# Patient Record
Sex: FEMALE | Race: WHITE | NOT HISPANIC OR LATINO | Employment: OTHER | URBAN - METROPOLITAN AREA
[De-identification: names, ages, dates, MRNs, and addresses within clinical notes are randomized per-mention and may not be internally consistent; named-entity substitution may affect disease eponyms.]

---

## 2017-12-08 ENCOUNTER — APPOINTMENT (OUTPATIENT)
Dept: WOMENS IMAGING | Facility: HOSPITAL | Age: 57
End: 2017-12-08

## 2017-12-08 PROCEDURE — G0202 SCR MAMMO BI INCL CAD: HCPCS | Performed by: RADIOLOGY

## 2017-12-08 PROCEDURE — 77067 SCR MAMMO BI INCL CAD: CPT | Performed by: RADIOLOGY

## 2018-12-10 ENCOUNTER — APPOINTMENT (OUTPATIENT)
Dept: WOMENS IMAGING | Facility: HOSPITAL | Age: 58
End: 2018-12-10

## 2018-12-10 PROCEDURE — 77067 SCR MAMMO BI INCL CAD: CPT | Performed by: RADIOLOGY

## 2018-12-10 PROCEDURE — 77063 BREAST TOMOSYNTHESIS BI: CPT | Performed by: RADIOLOGY

## 2019-12-19 ENCOUNTER — APPOINTMENT (OUTPATIENT)
Dept: WOMENS IMAGING | Facility: HOSPITAL | Age: 59
End: 2019-12-19

## 2019-12-19 PROCEDURE — 77067 SCR MAMMO BI INCL CAD: CPT | Performed by: RADIOLOGY

## 2019-12-19 PROCEDURE — 77063 BREAST TOMOSYNTHESIS BI: CPT | Performed by: RADIOLOGY

## 2020-12-22 ENCOUNTER — APPOINTMENT (OUTPATIENT)
Dept: WOMENS IMAGING | Facility: HOSPITAL | Age: 60
End: 2020-12-22

## 2020-12-22 PROCEDURE — 77063 BREAST TOMOSYNTHESIS BI: CPT | Performed by: RADIOLOGY

## 2020-12-22 PROCEDURE — 77067 SCR MAMMO BI INCL CAD: CPT | Performed by: RADIOLOGY

## 2021-07-22 ENCOUNTER — OFFICE VISIT (OUTPATIENT)
Dept: ENDOCRINOLOGY | Age: 61
End: 2021-07-22

## 2021-07-22 VITALS
WEIGHT: 145.8 LBS | HEART RATE: 77 BPM | OXYGEN SATURATION: 98 % | SYSTOLIC BLOOD PRESSURE: 126 MMHG | HEIGHT: 63 IN | DIASTOLIC BLOOD PRESSURE: 70 MMHG | BODY MASS INDEX: 25.83 KG/M2

## 2021-07-22 DIAGNOSIS — Z78.0 POST-MENOPAUSAL: ICD-10-CM

## 2021-07-22 DIAGNOSIS — E83.52 HYPERCALCEMIA: Primary | ICD-10-CM

## 2021-07-22 PROCEDURE — 99204 OFFICE O/P NEW MOD 45 MIN: CPT | Performed by: INTERNAL MEDICINE

## 2021-07-22 RX ORDER — LOSARTAN POTASSIUM AND HYDROCHLOROTHIAZIDE 25; 100 MG/1; MG/1
TABLET ORAL
COMMUNITY
Start: 2021-07-07 | End: 2022-01-24 | Stop reason: CLARIF

## 2021-07-22 RX ORDER — TRAZODONE HYDROCHLORIDE 150 MG/1
TABLET ORAL
COMMUNITY
Start: 2021-07-07

## 2021-07-22 RX ORDER — ESCITALOPRAM OXALATE 20 MG/1
TABLET ORAL
COMMUNITY
Start: 2021-07-07

## 2021-07-22 NOTE — PROGRESS NOTES
"Chief Complaint   Patient presents with   • Abnormal Calcium   NEW PATIENT/ ABNORMAL CALCIUM       HPI  Mary Rausch,61 y.o. is here as a new pt for the evaluation of elevated calcium levels. Consulted by .    Pt ca was noted to be elevated on routine blood work up with low - 18 pg/ml; PTH levels.   No hx of kidney stones, no hx of fragility fractures, no hx of osteopenia. No family hx of kidney stones and no family hx of osteoporosis.   Not on Ca and hasn't been on Vitamin D supplementation recently.  No c/o occasional abdominal pain, c/o bone pains and joint pain, no increased urination and no increased thirst.   Pt is on HCTZ - bp medication. Currently on no steroids. Drinks milk daily, eats cheese daily and yogurt.   Tums -  week.     No DEXA scan in the system and no 24 hr urine calcium levels.       Reviewed primary care physician's/consulting physician documentation and lab results       I have reviewed the patient's allergies, medicines, past medical hx, family hx and social hx in detail.    PMHX -   HTN   Stage 4 - breast and ovarian cancer    Family hx -   Parathyroid disease      Social History     Socioeconomic History   • Marital status: Single     Spouse name: Not on file   • Number of children: Not on file   • Years of education: Not on file   • Highest education level: Not on file   Tobacco Use   • Smoking status: Never Smoker   • Smokeless tobacco: Never Used       No Known Allergies      Current Outpatient Medications:   •  escitalopram (LEXAPRO) 20 MG tablet, , Disp: , Rfl:   •  losartan-hydrochlorothiazide (HYZAAR) 100-25 MG per tablet, , Disp: , Rfl:   •  traZODone (DESYREL) 150 MG tablet, , Disp: , Rfl:          Objective:    /70   Pulse 77   Ht 160 cm (63\")   Wt 66.1 kg (145 lb 12.8 oz)   SpO2 98%   BMI 25.83 kg/m²     Physical Exam  General appearance - no distress  Eyes- anicteric sclera  Ear nose and throat-external ears and nose normal.    Respiratory-normal chest " "on inspection.  No respiratory distress noted.  Skin-no rashes.  Neuro-alert and oriented x3            Results Review:    I reviewed the patient's new clinical results.    No results found for any previous visit.       Diagnoses and all orders for this visit:    1. Hypercalcemia (Primary)  -     TSH  -     T4, Free  -     T3, Free  -     Comprehensive Metabolic Panel  -     Protein Elec + Interp, Serum  -     Protein Electrophoresis, 24 Hr Urine - Urine, Clean Catch  -     PTH, Intact & Calcium  -     Vitamin D 25 Hydroxy  -     Phosphorus  -     Calcium, Ionized  -     Magnesium  -     Angiotensin Converting Enzyme  -     Vitamin D 1,25 Dihydroxy  -     DEXA Bone Density Axial; Future    2. Post-menopausal  -     TSH  -     T4, Free  -     T3, Free  -     Comprehensive Metabolic Panel  -     Protein Elec + Interp, Serum  -     Protein Electrophoresis, 24 Hr Urine - Urine, Clean Catch  -     PTH, Intact & Calcium  -     Vitamin D 25 Hydroxy  -     Phosphorus  -     Calcium, Ionized  -     Magnesium  -     Angiotensin Converting Enzyme  -     Vitamin D 1,25 Dihydroxy  -     DEXA Bone Density Axial; Future      Postmenopausal  Proceed with a bone density scan    Hypercalcemia  Noted that parathyroid levels are appropriately suppressed  Check PTH RP  Check serum and urine protein electrophoresis to rule out multiple myeloma  Repeat parathyroid calcium, vitamin D, phosphorus and magnesium levels.    Check ACE and vitamin 125 levels to rule out sarcoidosis.    Patient will need dental clearance prior to proceeding with a bone medication.        Thank you for asking me to see your patient Mary Rausch in consultation.      Refills/medications sent to pharmacy    Freddy Cobb MD  07/22/21    EMR Dragon / transcription disclaimer:     \"Dictated utilizing Dragon dictation\".         "

## 2021-07-23 LAB
1,25(OH)2D SERPL-MCNC: 57.3 PG/ML (ref 19.9–79.3)
25(OH)D3+25(OH)D2 SERPL-MCNC: 49.7 NG/ML (ref 30–100)
ACE SERPL-CCNC: 31 U/L (ref 14–82)
ALBUMIN SERPL ELPH-MCNC: 4.3 G/DL (ref 2.9–4.4)
ALBUMIN SERPL-MCNC: 4.7 G/DL (ref 3.5–5.2)
ALBUMIN/GLOB SERPL: 1.3 {RATIO} (ref 0.7–1.7)
ALBUMIN/GLOB SERPL: 1.6 G/DL
ALP SERPL-CCNC: 69 U/L (ref 39–117)
ALPHA1 GLOB SERPL ELPH-MCNC: 0.3 G/DL (ref 0–0.4)
ALPHA2 GLOB SERPL ELPH-MCNC: 0.7 G/DL (ref 0.4–1)
ALT SERPL-CCNC: 7 U/L (ref 1–33)
AST SERPL-CCNC: 21 U/L (ref 1–32)
B-GLOBULIN SERPL ELPH-MCNC: 1.2 G/DL (ref 0.7–1.3)
BILIRUB SERPL-MCNC: 0.4 MG/DL (ref 0–1.2)
BUN SERPL-MCNC: 10 MG/DL (ref 8–23)
BUN/CREAT SERPL: 13.2 (ref 7–25)
CA-I SERPL ISE-MCNC: 5.9 MG/DL (ref 4.5–5.6)
CALCIUM SERPL-MCNC: 11 MG/DL (ref 8.6–10.5)
CHLORIDE SERPL-SCNC: 101 MMOL/L (ref 98–107)
CO2 SERPL-SCNC: 24.7 MMOL/L (ref 22–29)
CREAT SERPL-MCNC: 0.76 MG/DL (ref 0.57–1)
GAMMA GLOB SERPL ELPH-MCNC: 1.3 G/DL (ref 0.4–1.8)
GLOBULIN SER CALC-MCNC: 3 GM/DL
GLOBULIN SER CALC-MCNC: 3.4 G/DL (ref 2.2–3.9)
GLUCOSE SERPL-MCNC: 90 MG/DL (ref 65–99)
INTACT PTH: NORMAL
LABORATORY COMMENT REPORT: NORMAL
M PROTEIN SERPL ELPH-MCNC: NORMAL G/DL
MAGNESIUM SERPL-MCNC: 1.6 MG/DL (ref 1.6–2.4)
PHOSPHATE SERPL-MCNC: 2.1 MG/DL (ref 2.5–4.5)
POTASSIUM SERPL-SCNC: 3.4 MMOL/L (ref 3.5–5.2)
PROT PATTERN SERPL ELPH-IMP: NORMAL
PROT SERPL-MCNC: 7.7 G/DL (ref 6–8.5)
PTH-INTACT SERPL-MCNC: 24 PG/ML (ref 15–65)
SODIUM SERPL-SCNC: 137 MMOL/L (ref 136–145)
T3FREE SERPL-MCNC: 3 PG/ML (ref 2–4.4)
T4 FREE SERPL-MCNC: 1.14 NG/DL (ref 0.93–1.7)
TSH SERPL DL<=0.005 MIU/L-ACNC: 1.27 UIU/ML (ref 0.27–4.2)

## 2021-07-26 DIAGNOSIS — E83.52 HYPERCALCEMIA: Primary | ICD-10-CM

## 2021-07-31 LAB
CALCIUM 24H UR-MCNC: 3.1 MG/DL
CALCIUM 24H UR-MRATE: 85 MG/24 HR (ref 47–462)
CREAT 24H UR-MRATE: 0.74 G/24 HR (ref 0.7–1.6)
CREAT UR-MCNC: 26.8 MG/DL

## 2021-10-28 ENCOUNTER — HOSPITAL ENCOUNTER (OUTPATIENT)
Dept: BONE DENSITY | Facility: HOSPITAL | Age: 61
Discharge: HOME | End: 2021-10-28
Admitting: INTERNAL MEDICINE

## 2021-10-28 DIAGNOSIS — E83.52 HYPERCALCEMIA: ICD-10-CM

## 2021-10-28 DIAGNOSIS — Z78.0 POST-MENOPAUSAL: ICD-10-CM

## 2021-10-28 PROCEDURE — 77080 DXA BONE DENSITY AXIAL: CPT

## 2022-01-20 ENCOUNTER — APPOINTMENT (OUTPATIENT)
Dept: WOMENS IMAGING | Facility: HOSPITAL | Age: 62
End: 2022-01-20

## 2022-01-20 PROCEDURE — 77067 SCR MAMMO BI INCL CAD: CPT | Performed by: RADIOLOGY

## 2022-01-20 PROCEDURE — 77063 BREAST TOMOSYNTHESIS BI: CPT | Performed by: RADIOLOGY

## 2022-01-24 ENCOUNTER — OFFICE VISIT (OUTPATIENT)
Dept: ENDOCRINOLOGY | Age: 62
End: 2022-01-24

## 2022-01-24 VITALS
HEIGHT: 63 IN | SYSTOLIC BLOOD PRESSURE: 122 MMHG | DIASTOLIC BLOOD PRESSURE: 72 MMHG | HEART RATE: 70 BPM | WEIGHT: 144.8 LBS | OXYGEN SATURATION: 98 % | BODY MASS INDEX: 25.66 KG/M2

## 2022-01-24 DIAGNOSIS — E83.52 HYPERCALCEMIA: ICD-10-CM

## 2022-01-24 DIAGNOSIS — Z78.0 POST-MENOPAUSAL: Primary | ICD-10-CM

## 2022-01-24 PROCEDURE — 99214 OFFICE O/P EST MOD 30 MIN: CPT | Performed by: INTERNAL MEDICINE

## 2022-01-24 RX ORDER — LOSARTAN POTASSIUM 100 MG/1
TABLET ORAL
COMMUNITY
Start: 2021-12-16

## 2022-01-24 NOTE — PROGRESS NOTES
"Chief Complaint  Chief Complaint   Patient presents with   • Abnormal Calcium   FOLLOW UP/ ABNORMAL CALCIUM     Subjective          History of Present Illness    Mary Rausch,62 y.o. is here as a follow-up for the evaluation of hypercalcemia.    Patient's calcium was noted to be elevated on routine blood work-up, but with lower end of normal parathyroid levels.  No history of kidney stones, fragility fractures.  Bone density scan from October 2021 showed osteopenia in her right hip.  Has been on calcium supplementation approximately 500 mg once daily.  Has not been on any vitamin D replacement recently.    She was on hydrochlorothiazide as her blood pressure medication but that was discontinued during her visit with us in July 2021.    Bone density scan from October 2021 showed osteopenia in her hips.  She has an upcoming appointment with the dentist on Wednesday.    Urine calcium and creatinine ratio-0.007    Reviewed primary care physician's/consulting physician documentation and lab results         I have reviewed the patient's allergies, medicines, past medical hx, family hx and social hx in detail.    Objective   Vital Signs:   /72   Pulse 70   Ht 160 cm (63\")   Wt 65.7 kg (144 lb 12.8 oz)   SpO2 98%   BMI 25.65 kg/m²   Physical Exam   General appearance - no distress  Eyes- anicteric sclera  Ear nose and throat-external ears and nose normal.    Respiratory-normal chest on inspection.  No respiratory distress noted.  Skin-no rashes.  Neuro-alert and oriented x3          Result Review :   The following data was reviewed by: Freddy Cobb MD on 01/24/2022:  Orders Only on 07/30/2021   Component Date Value Ref Range Status   • Creatinine, Urine 07/30/2021 26.8  mg/dL Final   • Creatinine, 24H  07/30/2021 0.74  0.70 - 1.60 g/24 hr Final   • Calcium, Urine 07/30/2021 3.1  Not Estab. mg/dL Final   • Calcium, Ur, Timed 07/30/2021 85  47 - 462 mg/24 hr Final     Data reviewed: PCP notes       Results " "Review:    I reviewed the patient's new clinical results.     Assessment and Plan    Problem List Items Addressed This Visit     None      Visit Diagnoses     Post-menopausal    -  Primary    Relevant Orders    Vitamin D 25 Hydroxy    Hemoglobin A1c    Lipid Panel    Calcium, Ionized    Phosphorus    Magnesium    PTH, Intact & Calcium    Comprehensive Metabolic Panel    Hypercalcemia        Relevant Orders    Vitamin D 25 Hydroxy    Hemoglobin A1c    Lipid Panel    Calcium, Ionized    Phosphorus    Magnesium    PTH, Intact & Calcium    Comprehensive Metabolic Panel        Postmenopausal-osteopenia  Discussed with the patient benefits of alendronate  Continue calcium and vitamin D.    Recommended the patient also get the clearance from the dentist before she proceeds with the medication.    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 Please see specific information pulled into the AVS if appropriate.       Thank you for asking me to see your patient, Mary Rausch in consultation.         Freddy Cobb MD  01/24/22      EMR Dragon / transcription disclaimer:     \"Dictated utilizing Dragon dictation\".                     "

## 2022-01-25 LAB
25(OH)D3+25(OH)D2 SERPL-MCNC: 33.2 NG/ML (ref 30–100)
ALBUMIN SERPL-MCNC: 4.5 G/DL (ref 3.8–4.8)
ALBUMIN/GLOB SERPL: 1.4 {RATIO} (ref 1.2–2.2)
ALP SERPL-CCNC: 75 IU/L (ref 44–121)
ALT SERPL-CCNC: 12 IU/L (ref 0–32)
AST SERPL-CCNC: 19 IU/L (ref 0–40)
BILIRUB SERPL-MCNC: 0.4 MG/DL (ref 0–1.2)
BUN SERPL-MCNC: 15 MG/DL (ref 8–27)
BUN/CREAT SERPL: 16 (ref 12–28)
CA-I SERPL ISE-MCNC: 5.9 MG/DL (ref 4.5–5.6)
CALCIUM SERPL-MCNC: 10.6 MG/DL (ref 8.7–10.3)
CHLORIDE SERPL-SCNC: 102 MMOL/L (ref 96–106)
CHOLEST SERPL-MCNC: 232 MG/DL (ref 100–199)
CO2 SERPL-SCNC: 24 MMOL/L (ref 20–29)
CREAT SERPL-MCNC: 0.93 MG/DL (ref 0.57–1)
GLOBULIN SER CALC-MCNC: 3.2 G/DL (ref 1.5–4.5)
GLUCOSE SERPL-MCNC: 91 MG/DL (ref 65–99)
HBA1C MFR BLD: 5.2 % (ref 4.8–5.6)
HDLC SERPL-MCNC: 89 MG/DL
IMP & REVIEW OF LAB RESULTS: NORMAL
INTACT PTH: NORMAL
LDLC SERPL CALC-MCNC: 130 MG/DL (ref 0–99)
MAGNESIUM SERPL-MCNC: 1.7 MG/DL (ref 1.6–2.3)
PHOSPHATE SERPL-MCNC: 2.7 MG/DL (ref 3–4.3)
POTASSIUM SERPL-SCNC: 4.4 MMOL/L (ref 3.5–5.2)
PROT SERPL-MCNC: 7.7 G/DL (ref 6–8.5)
PTH-INTACT SERPL-MCNC: 27 PG/ML (ref 15–65)
SODIUM SERPL-SCNC: 139 MMOL/L (ref 134–144)
TRIGL SERPL-MCNC: 74 MG/DL (ref 0–149)
VLDLC SERPL CALC-MCNC: 13 MG/DL (ref 5–40)

## 2022-01-25 NOTE — PROGRESS NOTES
Your calcium level has significantly improved from 11 to 10.6.  At this point we will continue to monitor.Let us know if you have made up your mind regarding the bone medication-alendronate/Fosamax.Most probably the type of problem-hypercalcemia could be genetic related.

## 2022-02-24 ENCOUNTER — TELEPHONE (OUTPATIENT)
Dept: ENDOCRINOLOGY | Age: 62
End: 2022-02-24

## 2022-03-18 DIAGNOSIS — E83.52 HYPERCALCEMIA: ICD-10-CM

## 2022-03-18 DIAGNOSIS — Z78.0 POST-MENOPAUSAL: Primary | ICD-10-CM

## 2022-05-20 ENCOUNTER — OFFICE VISIT (OUTPATIENT)
Dept: OBSTETRICS AND GYNECOLOGY | Age: 62
End: 2022-05-20

## 2022-05-20 VITALS
BODY MASS INDEX: 25.69 KG/M2 | DIASTOLIC BLOOD PRESSURE: 72 MMHG | SYSTOLIC BLOOD PRESSURE: 122 MMHG | HEIGHT: 63 IN | WEIGHT: 145 LBS

## 2022-05-20 DIAGNOSIS — Z85.43 H/O OVARIAN CANCER: ICD-10-CM

## 2022-05-20 DIAGNOSIS — Z01.419 WELL FEMALE EXAM WITH ROUTINE GYNECOLOGICAL EXAM: Primary | ICD-10-CM

## 2022-05-20 DIAGNOSIS — Z13.89 SCREENING FOR BLOOD OR PROTEIN IN URINE: ICD-10-CM

## 2022-05-20 LAB
BILIRUB BLD-MCNC: NEGATIVE MG/DL
CLARITY, POC: CLEAR
COLOR UR: YELLOW
GLUCOSE UR STRIP-MCNC: NEGATIVE MG/DL
KETONES UR QL: NEGATIVE
LEUKOCYTE EST, POC: NEGATIVE
NITRITE UR-MCNC: NEGATIVE MG/ML
PH UR: 5.5 [PH] (ref 5–8)
PROT UR STRIP-MCNC: NEGATIVE MG/DL
RBC # UR STRIP: NEGATIVE /UL
SP GR UR: 1.02 (ref 1–1.03)
UROBILINOGEN UR QL: NORMAL

## 2022-05-20 PROCEDURE — 99386 PREV VISIT NEW AGE 40-64: CPT | Performed by: OBSTETRICS & GYNECOLOGY

## 2022-05-20 PROCEDURE — 81003 URINALYSIS AUTO W/O SCOPE: CPT | Performed by: OBSTETRICS & GYNECOLOGY

## 2022-05-20 RX ORDER — ESTRADIOL 10 UG/1
1 INSERT VAGINAL 2 TIMES WEEKLY
COMMUNITY
End: 2022-10-18 | Stop reason: SDUPTHER

## 2022-05-20 NOTE — PROGRESS NOTES
[facility]   Obstetrics and Gynecology       2022    Patient: Mary Rausch          MR#:[id number]    History of Present Illness    Chief Complaint   Patient presents with   • Gynecologic Exam     New pt annual exam , doesn't get paps any more Has a history of ovarian and breast cancer both in , last m/g 2022 neg , dexa 10/28/2021, c/scope due in        62 y.o. female  who presents for annual exam.    Patient presents feeling well without any complaints.  She has a remote history of nonhormonal receptor negative breast cancer in early stage endometrial cancer treated with surgery and chemo.  The patient's remained disease-free since her original surgery.    I discussed some concerns with the patient taking vaginal estrogen with history of breast cancer but she reports only using the Vagifem periodically for severe symptoms of atrophy and she is confident she was receptor negative        Studies reviewed:    No LMP recorded. Patient has had a hysterectomy.  Obstetric History:  OB History        2    Para   2    Term   2            AB        Living   2       SAB        IAB        Ectopic        Molar        Multiple        Live Births   2               Menstrual History:     No LMP recorded. Patient has had a hysterectomy.       Sexual History:       Social History     Substance and Sexual Activity   Sexual Activity Yes       ________________________________________  Patient Active Problem List   Diagnosis   • History of ovarian cancer   • Hx antineoplastic chemotherapy   • Hx of hysterectomy   • Hx of migraines   • Hypertension   • Personal history of breast cancer   • Vaginal atrophy     Past Medical History:   Diagnosis Date   • Breast cancer (HCC)    • Endometrial cancer (HCC)    • Hypertension      Past Surgical History:   Procedure Laterality Date   • APPENDECTOMY      age 13   • BILATERAL SALPINGO OOPHORECTOMY     • BREAST LUMPECTOMY     •  " SECTION     • HYSTERECTOMY         • VEIN SURGERY       Social History     Tobacco Use   Smoking Status Never Smoker   Smokeless Tobacco Never Used     Family History   Problem Relation Age of Onset   • Other Sister         fallopian tube.     Prior to Admission medications    Medication Sig Start Date End Date Taking? Authorizing Provider   escitalopram (LEXAPRO) 20 MG tablet  21  Yes Gera Samayoa MD   estradiol (VAGIFEM) 10 MCG tablet vaginal tablet Insert 1 tablet into the vagina 2 (Two) Times a Week.   Yes Gera Samayoa MD   losartan (COZAAR) 100 MG tablet  21  Yes Gera Samayoa MD   traZODone (DESYREL) 150 MG tablet  21  Yes ProviderGera MD     ________________________________________    Current contraception: status post hysterectomy  History of abnormal Pap smear: no  Family history of uterine or ovarian cancer: no  Family History of colon cancer/colon polyps: no  History of abnormal mammogram: no  History of abnormal lipids: no    The following portions of the patient's history were reviewed and updated as appropriate: allergies, current medications, past family history, past medical history, past social history, past surgical history and problem list.    Review of Systems    Pertinent items are noted in HPI.       Objective   Physical Exam    /72   Ht 160 cm (63\")   Wt 65.8 kg (145 lb)   Breastfeeding No   BMI 25.69 kg/m²    BP Readings from Last 3 Encounters:   22 122/72   22 122/72   21 126/70      Wt Readings from Last 3 Encounters:   22 65.8 kg (145 lb)   22 65.7 kg (144 lb 12.8 oz)   21 66.1 kg (145 lb 12.8 oz)        BMI: Estimated body mass index is 25.69 kg/m² as calculated from the following:    Height as of this encounter: 160 cm (63\").    Weight as of this encounter: 65.8 kg (145 lb).       General: alert, appears stated age and cooperative   Heart: regular rate and rhythm, S1, S2 normal, no " murmur, click, rub or gallop   Lungs: clear to auscultation bilaterally   Abdomen: soft, non-tender, without masses, no organomegaly   Breast: inspection negative, no nipple discharge or bleeding, no masses or nodularity palpable and prior lumpectomy in the right breast evident   External genitalia/Vulva: moderate atrophy, External genitalia including bartholin's glands, Urethra, [place]'s gland and urethra meatus are normal, Perineum, rectum and anus appear normal  and Bladder appears normal without significant prolapse    Vagina: normal mucosa, normal discharge   Cervix: absent   Uterus: absent    Adnexa: normal adnexa     As part of wellness and prevention, the following topics were discussed with the patient:  Encouraged self breast exam  Physical activity and regular exercised encouraged.     Assessment:  Diagnoses and all orders for this visit:    1. Well female exam with routine gynecological exam (Primary)    2. Screening for blood or protein in urine  -     POC Urinalysis Dipstick, Multipro    3. H/O ovarian cancer  -             Plan:  Return in about 2 years (around 5/20/2024) for Annual GYN exam.    Darian Mancera MD  5/20/2022 12:33 EDT

## 2022-05-28 LAB — CANCER AG125 SERPL-ACNC: 7.1 U/ML (ref 0–38.1)

## 2022-09-09 ENCOUNTER — OFFICE VISIT (OUTPATIENT)
Dept: ENDOCRINOLOGY | Age: 62
End: 2022-09-09

## 2022-09-09 VITALS
HEIGHT: 63 IN | BODY MASS INDEX: 25.98 KG/M2 | OXYGEN SATURATION: 99 % | HEART RATE: 67 BPM | DIASTOLIC BLOOD PRESSURE: 70 MMHG | TEMPERATURE: 96.6 F | SYSTOLIC BLOOD PRESSURE: 120 MMHG | WEIGHT: 146.6 LBS

## 2022-09-09 DIAGNOSIS — E83.52 HYPERCALCEMIA: ICD-10-CM

## 2022-09-09 DIAGNOSIS — Z78.0 POST-MENOPAUSAL: Primary | ICD-10-CM

## 2022-09-09 PROCEDURE — 99214 OFFICE O/P EST MOD 30 MIN: CPT | Performed by: INTERNAL MEDICINE

## 2022-09-09 RX ORDER — PHENOL 1.4 %
10 AEROSOL, SPRAY (ML) MUCOUS MEMBRANE
COMMUNITY

## 2022-09-09 RX ORDER — AMOXICILLIN AND CLAVULANATE POTASSIUM 875; 125 MG/1; MG/1
1 TABLET, FILM COATED ORAL 2 TIMES DAILY
COMMUNITY
Start: 2022-09-08 | End: 2022-09-18

## 2022-09-09 NOTE — PROGRESS NOTES
"Chief Complaint  Abnormal Calcium    Subjective            History of Present Illness  Mary Rausch,62 y.o. is here as a  follow-up for the evaluation of hypercalcemia.     Patient's calcium was noted to be elevated on routine blood work-up, but with lower end of normal parathyroid levels.  No history of kidney stones, fragility fractures.  Has been on vitamin D replacement but not on any calcium.     She was on hydrochlorothiazide as her blood pressure medication but that was discontinued during her visit with us in July 2021.     Bone density scan from October 2021 showed osteopenia in her hips. Urine calcium and creatinine ratio-0.007 -based on the work-up patient was thought to have family hypocalciuric hypercalcemia.    Osteopenia-patient got the clearance from the dentist to consider the Fosamax, however patient is still knows of to consider the medication and would want to follow behavioral changes.      Reviewed primary care physician's/consulting physician documentation and lab results     I have reviewed the patient's allergies, medicines, past medical hx, family hx and social hx in detail.    Objective   Vital Signs:   /70   Pulse 67   Temp 96.6 °F (35.9 °C)   Ht 160 cm (63\")   Wt 66.5 kg (146 lb 9.6 oz)   SpO2 99%   BMI 25.97 kg/m²     Physical Exam   General appearance - no distress  Eyes- anicteric sclera  Ear nose and throat-external ears and nose normal.    Respiratory-normal chest on inspection.  No respiratory distress noted.  Skin-no rashes.  Neuro-alert and oriented x3            Result Review :   The following data was reviewed by: Freddy Cobb MD on 09/09/2022:  Office Visit on 05/20/2022   Component Date Value Ref Range Status   • Color 05/20/2022 Yellow  Yellow, Straw, Dark Yellow, Saira Final   • Clarity, UA 05/20/2022 Clear  Clear Final   • Glucose, UA 05/20/2022 Negative  Negative, 1000 mg/dL (3+) mg/dL Final   • Bilirubin 05/20/2022 Negative  Negative Final   • Ketones, " UA 05/20/2022 Negative  Negative Final   • Specific Gravity  05/20/2022 1.025  1.005 - 1.030 Final   • Blood, UA 05/20/2022 Negative  Negative Final   • pH, Urine 05/20/2022 5.5  5.0 - 8.0 Final   • Protein, POC 05/20/2022 Negative  Negative mg/dL Final   • Urobilinogen, UA 05/20/2022 Normal  Normal Final   • Nitrite, UA 05/20/2022 Negative  Negative Final   • Leukocytes 05/20/2022 Negative  Negative Final   •  05/27/2022 7.1  0.0 - 38.1 U/mL Final    Comment: [employer] Electrochemiluminescence Immunoassay (ECLIA)  Values obtained with different assay methods or kits cannot be  used interchangeably.  Results cannot be interpreted as absolute  evidence of the presence or absence of malignant disease.       Data reviewed: PCP and endocrine notes        I reviewed the patient's new clinical results and mentioned them above in HPI and in plan as well.          Assessment and Plan    Problem List Items Addressed This Visit    None     Visit Diagnoses     Post-menopausal    -  Primary    Relevant Orders    TSH    T3, Free    T4, Free    Lipid Panel    Vitamin B12 & Folate    Vitamin D 25 Hydroxy    CBC Auto Differential    Comprehensive Metabolic Panel    Hypercalcemia        Relevant Orders    TSH    T3, Free    T4, Free    Lipid Panel    Vitamin B12 & Folate    Vitamin D 25 Hydroxy    CBC Auto Differential    Comprehensive Metabolic Panel        Hypercalcemia-etiology formerly [facility]  Continue to monitor the calcium level.    Postmenopausal-osteopenia  Patient is still apprehensive to proceed with this time patient will follow-up pending bone density scan in October 2023.    Follow Up   No follow-ups on file.    Refills/Meds sent to pharmacy    Interpreted the blood work-up/imaging results performed by the primary care/consulting physician -    Patient was given instructions and counseling regarding her condition or for health maintenance advice. Please see specific information pulled into the AVS if appropriate.

## 2022-09-10 LAB
25(OH)D3+25(OH)D2 SERPL-MCNC: 47.5 NG/ML (ref 30–100)
ALBUMIN SERPL-MCNC: 4.8 G/DL (ref 3.8–4.8)
ALBUMIN/GLOB SERPL: 1.7 {RATIO} (ref 1.2–2.2)
ALP SERPL-CCNC: 76 IU/L (ref 44–121)
ALT SERPL-CCNC: 16 IU/L (ref 0–32)
AST SERPL-CCNC: 22 IU/L (ref 0–40)
BASOPHILS # BLD AUTO: 0 X10E3/UL (ref 0–0.2)
BASOPHILS NFR BLD AUTO: 0 %
BILIRUB SERPL-MCNC: 0.4 MG/DL (ref 0–1.2)
BUN SERPL-MCNC: 20 MG/DL (ref 8–27)
BUN/CREAT SERPL: 22 (ref 12–28)
CALCIUM SERPL-MCNC: 10.9 MG/DL (ref 8.7–10.3)
CHLORIDE SERPL-SCNC: 97 MMOL/L (ref 96–106)
CHOLEST SERPL-MCNC: 230 MG/DL (ref 100–199)
CO2 SERPL-SCNC: 22 MMOL/L (ref 20–29)
CREAT SERPL-MCNC: 0.9 MG/DL (ref 0.57–1)
EGFRCR-CYS SERPLBLD CKD-EPI 2021: 71 ML/MIN/1.73
EOSINOPHIL # BLD AUTO: 0.1 X10E3/UL (ref 0–0.4)
EOSINOPHIL NFR BLD AUTO: 1 %
ERYTHROCYTE [DISTWIDTH] IN BLOOD BY AUTOMATED COUNT: 12.9 % (ref 11.7–15.4)
FOLATE SERPL-MCNC: 6.3 NG/ML
GLOBULIN SER CALC-MCNC: 2.9 G/DL (ref 1.5–4.5)
GLUCOSE SERPL-MCNC: 91 MG/DL (ref 65–99)
HCT VFR BLD AUTO: 41.2 % (ref 34–46.6)
HDLC SERPL-MCNC: 62 MG/DL
HGB BLD-MCNC: 13.7 G/DL (ref 11.1–15.9)
IMM GRANULOCYTES # BLD AUTO: 0 X10E3/UL (ref 0–0.1)
IMM GRANULOCYTES NFR BLD AUTO: 0 %
IMP & REVIEW OF LAB RESULTS: NORMAL
LDLC SERPL CALC-MCNC: 145 MG/DL (ref 0–99)
LYMPHOCYTES # BLD AUTO: 1.3 X10E3/UL (ref 0.7–3.1)
LYMPHOCYTES NFR BLD AUTO: 15 %
MCH RBC QN AUTO: 30.5 PG (ref 26.6–33)
MCHC RBC AUTO-ENTMCNC: 33.3 G/DL (ref 31.5–35.7)
MCV RBC AUTO: 92 FL (ref 79–97)
MONOCYTES # BLD AUTO: 0.4 X10E3/UL (ref 0.1–0.9)
MONOCYTES NFR BLD AUTO: 5 %
MORPHOLOGY BLD-IMP: NORMAL
NEUTROPHILS # BLD AUTO: 6.6 X10E3/UL (ref 1.4–7)
NEUTROPHILS NFR BLD AUTO: 79 %
PLATELET # BLD AUTO: NORMAL X10E3/UL
POTASSIUM SERPL-SCNC: 4.5 MMOL/L (ref 3.5–5.2)
PROT SERPL-MCNC: 7.7 G/DL (ref 6–8.5)
RBC # BLD AUTO: 4.49 X10E6/UL (ref 3.77–5.28)
SODIUM SERPL-SCNC: 136 MMOL/L (ref 134–144)
T3FREE SERPL-MCNC: 2.8 PG/ML (ref 2–4.4)
T4 FREE SERPL-MCNC: 1.14 NG/DL (ref 0.82–1.77)
TRIGL SERPL-MCNC: 128 MG/DL (ref 0–149)
TSH SERPL DL<=0.005 MIU/L-ACNC: 1.49 UIU/ML (ref 0.45–4.5)
VIT B12 SERPL-MCNC: 228 PG/ML (ref 232–1245)
VLDLC SERPL CALC-MCNC: 23 MG/DL (ref 5–40)
WBC # BLD AUTO: 8.4 X10E3/UL (ref 3.4–10.8)

## 2022-10-19 RX ORDER — ESTRADIOL 10 UG/1
1 INSERT VAGINAL 2 TIMES WEEKLY
Qty: 8 TABLET | Refills: 12 | Status: SHIPPED | OUTPATIENT
Start: 2022-10-20 | End: 2023-03-13 | Stop reason: SDUPTHER

## 2023-03-13 RX ORDER — ESTRADIOL 10 UG/1
1 INSERT VAGINAL 2 TIMES WEEKLY
Qty: 8 TABLET | Refills: 12 | Status: SHIPPED | OUTPATIENT
Start: 2023-03-13 | End: 2023-04-05 | Stop reason: SDUPTHER

## 2023-03-13 NOTE — TELEPHONE ENCOUNTER
Pt called needing new prescription for estradiol sent to [facility] order pharmacy

## 2023-03-17 ENCOUNTER — APPOINTMENT (OUTPATIENT)
Dept: WOMENS IMAGING | Facility: HOSPITAL | Age: 63
End: 2023-03-17
Payer: MEDICARE

## 2023-03-17 PROCEDURE — 77063 BREAST TOMOSYNTHESIS BI: CPT | Performed by: RADIOLOGY

## 2023-03-17 PROCEDURE — 77067 SCR MAMMO BI INCL CAD: CPT | Performed by: RADIOLOGY

## 2023-04-05 RX ORDER — ESTRADIOL 10 UG/1
1 INSERT VAGINAL 2 TIMES WEEKLY
Qty: 8 TABLET | Refills: 12 | Status: SHIPPED | OUTPATIENT
Start: 2023-04-06 | End: 2023-04-11 | Stop reason: SDUPTHER

## 2023-04-05 NOTE — TELEPHONE ENCOUNTER
Pts script never arrived at [facility], pt requesting script be sent to [facility]  Annual 5/20/22, scheduled 7/10/23

## 2023-04-11 ENCOUNTER — TELEPHONE (OUTPATIENT)
Dept: OBSTETRICS AND GYNECOLOGY | Age: 63
End: 2023-04-11
Payer: MEDICARE

## 2023-04-11 RX ORDER — ESTRADIOL 10 UG/1
1 INSERT VAGINAL 2 TIMES WEEKLY
Qty: 8 TABLET | Refills: 12 | Status: SHIPPED | OUTPATIENT
Start: 2023-04-13

## 2023-04-11 NOTE — TELEPHONE ENCOUNTER
Pt called stating her pharmacy never received her estradiol rx. She [facility] Called pharmacy and confirmed that they do not have the rx. Could you resend estradiol rx to [facility] on [address]? Please advise.

## 2024-02-29 RX ORDER — IBUPROFEN 200 MG
400 TABLET ORAL EVERY 6 HOURS PRN
COMMUNITY

## 2024-03-01 ENCOUNTER — ANESTHESIA (OUTPATIENT)
Dept: GASTROENTEROLOGY | Facility: HOSPITAL | Age: 64
End: 2024-03-01
Payer: MEDICARE

## 2024-03-01 ENCOUNTER — HOSPITAL ENCOUNTER (OUTPATIENT)
Facility: HOSPITAL | Age: 64
Setting detail: HOSPITAL OUTPATIENT SURGERY
Discharge: HOME | End: 2024-03-01
Attending: INTERNAL MEDICINE | Admitting: INTERNAL MEDICINE
Payer: MEDICARE

## 2024-03-01 ENCOUNTER — ANESTHESIA EVENT (OUTPATIENT)
Dept: GASTROENTEROLOGY | Facility: HOSPITAL | Age: 64
End: 2024-03-01
Payer: MEDICARE

## 2024-03-01 ENCOUNTER — ON CAMPUS - OUTPATIENT (OUTPATIENT)
Dept: URBAN - METROPOLITAN AREA HOSPITAL 114 | Facility: HOSPITAL | Age: 64
End: 2024-03-01

## 2024-03-01 VITALS
OXYGEN SATURATION: 96 % | RESPIRATION RATE: 20 BRPM | SYSTOLIC BLOOD PRESSURE: 112 MMHG | DIASTOLIC BLOOD PRESSURE: 64 MMHG | BODY MASS INDEX: 25.91 KG/M2 | HEART RATE: 72 BPM | WEIGHT: 146.2 LBS | HEIGHT: 63 IN

## 2024-03-01 DIAGNOSIS — Z12.11 ENCOUNTER FOR SCREENING FOR MALIGNANT NEOPLASM OF COLON: ICD-10-CM

## 2024-03-01 DIAGNOSIS — Q43.8 OTHER SPECIFIED CONGENITAL MALFORMATIONS OF INTESTINE: ICD-10-CM

## 2024-03-01 PROCEDURE — 25810000003 LACTATED RINGERS PER 1000 ML: Performed by: INTERNAL MEDICINE

## 2024-03-01 PROCEDURE — 25810000003 LACTATED RINGERS PER 1000 ML

## 2024-03-01 PROCEDURE — G0121 COLON CA SCRN NOT HI RSK IND: HCPCS | Performed by: INTERNAL MEDICINE

## 2024-03-01 PROCEDURE — 25010000002 PROPOFOL 10 MG/ML EMULSION

## 2024-03-01 PROCEDURE — 25010000002 GLYCOPYRROLATE 0.2 MG/ML SOLUTION

## 2024-03-01 PROCEDURE — 25010000002 PHENYLEPHRINE 10 MG/ML SOLUTION

## 2024-03-01 RX ORDER — PHENYLEPHRINE HYDROCHLORIDE 10 MG/ML
INJECTION INTRAVENOUS AS NEEDED
Status: DISCONTINUED | OUTPATIENT
Start: 2024-03-01 | End: 2024-03-01 | Stop reason: SURG

## 2024-03-01 RX ORDER — SODIUM CHLORIDE, SODIUM LACTATE, POTASSIUM CHLORIDE, CALCIUM CHLORIDE 600; 310; 30; 20 MG/100ML; MG/100ML; MG/100ML; MG/100ML
30 INJECTION, SOLUTION INTRAVENOUS CONTINUOUS PRN
Status: DISCONTINUED | OUTPATIENT
Start: 2024-03-01 | End: 2024-03-01 | Stop reason: HOSPADM

## 2024-03-01 RX ORDER — PROPOFOL 10 MG/ML
VIAL (ML) INTRAVENOUS AS NEEDED
Status: DISCONTINUED | OUTPATIENT
Start: 2024-03-01 | End: 2024-03-01 | Stop reason: SURG

## 2024-03-01 RX ORDER — GLYCOPYRROLATE 0.2 MG/ML
INJECTION INTRAMUSCULAR; INTRAVENOUS AS NEEDED
Status: DISCONTINUED | OUTPATIENT
Start: 2024-03-01 | End: 2024-03-01 | Stop reason: SURG

## 2024-03-01 RX ORDER — LIDOCAINE HYDROCHLORIDE 20 MG/ML
INJECTION, SOLUTION INFILTRATION; PERINEURAL AS NEEDED
Status: DISCONTINUED | OUTPATIENT
Start: 2024-03-01 | End: 2024-03-01 | Stop reason: SURG

## 2024-03-01 RX ORDER — EPHEDRINE SULFATE 50 MG/ML
INJECTION, SOLUTION INTRAVENOUS AS NEEDED
Status: DISCONTINUED | OUTPATIENT
Start: 2024-03-01 | End: 2024-03-01 | Stop reason: SURG

## 2024-03-01 RX ORDER — SODIUM CHLORIDE, SODIUM LACTATE, POTASSIUM CHLORIDE, CALCIUM CHLORIDE 600; 310; 30; 20 MG/100ML; MG/100ML; MG/100ML; MG/100ML
INJECTION, SOLUTION INTRAVENOUS CONTINUOUS PRN
Status: DISCONTINUED | OUTPATIENT
Start: 2024-03-01 | End: 2024-03-01 | Stop reason: SURG

## 2024-03-01 RX ADMIN — LIDOCAINE HYDROCHLORIDE 60 MG: 20 INJECTION, SOLUTION INFILTRATION; PERINEURAL at 09:49

## 2024-03-01 RX ADMIN — GLYCOPYRROLATE 0.2 MG: 0.2 INJECTION INTRAMUSCULAR; INTRAVENOUS at 09:55

## 2024-03-01 RX ADMIN — PHENYLEPHRINE HYDROCHLORIDE 100 MCG: 10 INJECTION INTRAVENOUS at 10:27

## 2024-03-01 RX ADMIN — EPHEDRINE SULFATE 5 MG: 50 INJECTION INTRAVENOUS at 10:07

## 2024-03-01 RX ADMIN — PHENYLEPHRINE HYDROCHLORIDE 100 MCG: 10 INJECTION INTRAVENOUS at 10:17

## 2024-03-01 RX ADMIN — PROPOFOL 200 MCG/KG/MIN: 10 INJECTION, EMULSION INTRAVENOUS at 09:50

## 2024-03-01 RX ADMIN — SODIUM CHLORIDE, POTASSIUM CHLORIDE, SODIUM LACTATE AND CALCIUM CHLORIDE 30 ML/HR: 600; 310; 30; 20 INJECTION, SOLUTION INTRAVENOUS at 09:20

## 2024-03-01 RX ADMIN — SODIUM CHLORIDE, POTASSIUM CHLORIDE, SODIUM LACTATE AND CALCIUM CHLORIDE: 600; 310; 30; 20 INJECTION, SOLUTION INTRAVENOUS at 09:44

## 2024-03-01 RX ADMIN — PROPOFOL 60 MG: 10 INJECTION, EMULSION INTRAVENOUS at 09:49

## 2024-03-01 RX ADMIN — PHENYLEPHRINE HYDROCHLORIDE 100 MCG: 10 INJECTION INTRAVENOUS at 10:10

## 2024-03-01 NOTE — DISCHARGE INSTRUCTIONS
For the next 24 hours patient needs to be with a responsible adult.    For 24 hours DO NOT drive, operate machinery, appliances, drink alcohol, make important decisions or sign legal documents.    Start with a light or bland diet if you are feeling sick to your stomach otherwise advance to regular diet as tolerated.    Follow recommendations on procedure report if provided by your doctor.    Call Dr Negron for problems [phone]    Problems may include but not limited to: large amounts of bleeding, trouble breathing, repeated vomiting, severe unrelieved pain, fever or chills.

## 2024-03-01 NOTE — ANESTHESIA PREPROCEDURE EVALUATION
Anesthesia Evaluation     Patient summary reviewed   no history of anesthetic complications:   NPO Solid Status: > 8 hours  NPO Liquid Status: > 2 hours           Airway   Mallampati: II  TM distance: >3 FB  Neck ROM: full  No difficulty expected  Dental - normal exam     Pulmonary     breath sounds clear to auscultation  (-) shortness of breath, recent URI, not a smoker  Cardiovascular   Exercise tolerance: good (4-7 METS)    Rhythm: regular  Rate: normal    (+) hypertension  (-) past MI, dysrhythmias, angina      Neuro/Psych  (-) seizures, CVA  GI/Hepatic/Renal/Endo    (-)  obesity, no renal disease, diabetes    Musculoskeletal     (-) neck stiffness  Abdominal    Substance History      OB/GYN          Other      history of cancer (h/o Ovarian and breast (2008), s/p resection and chemo)                      Anesthesia Plan    ASA 2     MAC     (MAC anesthesia discussed with patient and/or patient representative. Risks (including but not limited to intra-op awareness), benefits, and alternatives were discussed. Understanding was voiced with an agreement to proceed with a MAC technique and General as a backup option. )    Anesthetic plan, risks, benefits, and alternatives have been provided, discussed and informed consent has been obtained with: patient.        CODE STATUS:

## 2024-03-01 NOTE — H&P
[facility]   HISTORY AND PHYSICAL    Patient Name: Mary Rausch  : 1960  MRN: [id number]  Primary Care Physician:  Xiomara Gay MD  Date of admission: 3/1/2024    Subjective   Subjective     Chief Complaint: screening for colon cancer     History of Present Illness  The patient presents with no gastrointestinal  Symptoms or issues at this time   Review of Systems   All other systems reviewed and are negative.       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 Otherwise pertinent FHx was reviewed and not pertinent to current issue.    Social History:  reports that she has never smoked. She has never used smokeless tobacco. She reports current alcohol use. She reports that she does not use drugs.    Home Medications:  Melatonin, cholecalciferol, escitalopram, estradiol, ibuprofen, losartan, and traZODone    Allergies:  No Known Allergies    Objective    Objective     Vitals:   Heart Rate:  [77] 77  Resp:  [18] 18  BP: (139)/(73) 139/73    Physical Exam  HENT:      Right Ear: External ear normal.      Left Ear: External ear normal.      Mouth/Throat:      Pharynx: Oropharynx is clear.   Eyes:      Conjunctiva/sclera: Conjunctivae normal.   Cardiovascular:      Rate and Rhythm: Normal rate.      Pulses: Normal pulses.   Pulmonary:      Effort: Pulmonary effort is normal.   Abdominal:      General: Abdomen is flat.   Skin:     General: Skin is warm.   Neurological:      General: No focal deficit present.      Mental  Status: She is alert.   Psychiatric:         Mood and Affect: Mood normal.         Result Review    Result Review:  I have personally reviewed the results from the time of this admission to 3/1/2024 09:48 EST and agree with these findings:  []  Laboratory list / accordion  []  Microbiology  []  Radiology  []  EKG/Telemetry   []  Cardiology/Vascular   []  Pathology  []  Old records  []  Other:  Most notable findings include:       Assessment & Plan   Assessment / Plan     Brief Patient Summary:  Mary Rausch is a 64 y.o. female who   Age related colon cancer screening  Active Hospital Problems:  There are no active hospital problems to display for this patient.    Plan: Colonoscopy risks, alternatives and benefits discussed with patient and the patient is agreeable to having procedure done.      DVT prophylaxis:  No DVT prophylaxis order currently exists.        CODE STATUS:       Admission Status:  I believe this patient meets outpatient  status.    Param Negron MD

## 2024-03-01 NOTE — ANESTHESIA POSTPROCEDURE EVALUATION
"Patient: Mary Rausch    Procedure Summary       Date: 03/01/24 Room / Location:  [facility]    Anesthesia Start: 0944 Anesthesia Stop: 1030    Procedure: COLONOSCOPY to cecum and Ti: Diagnosis:     Surgeons: Param Negron MD Provider: Malcolm Reyna DO    Anesthesia Type: MAC ASA Status: 2            Anesthesia Type: MAC    Vitals  Vitals Value Taken Time   BP 94/63 03/01/24 1057   Temp     Pulse 72 03/01/24 1111   Resp 22 03/01/24 1052   SpO2 96 % 03/01/24 1111   Vitals shown include unfiled device data.        Post Anesthesia Care and Evaluation    Patient location during evaluation: bedside  Patient participation: complete - patient participated  Level of consciousness: awake and alert  Pain management: adequate    Airway patency: patent  Anesthetic complications: No anesthetic complications  PONV Status: controlled  Cardiovascular status: acceptable and hemodynamically stable  Respiratory status: acceptable, spontaneous ventilation and nonlabored ventilation  Hydration status: acceptable    Comments: /56   Pulse 80   Resp 22   Ht 160 cm (63\")   Wt 66.3 kg (146 lb 3.2 oz)   SpO2 95%   BMI 25.90 kg/m²       "

## 2024-06-12 ENCOUNTER — APPOINTMENT (OUTPATIENT)
Dept: WOMENS IMAGING | Facility: HOSPITAL | Age: 64
End: 2024-06-12
Payer: MEDICARE

## 2024-06-12 PROCEDURE — 77067 SCR MAMMO BI INCL CAD: CPT | Performed by: RADIOLOGY

## 2024-06-12 PROCEDURE — 77063 BREAST TOMOSYNTHESIS BI: CPT | Performed by: RADIOLOGY

## 2024-07-23 ENCOUNTER — OFFICE VISIT (OUTPATIENT)
Dept: OBSTETRICS AND GYNECOLOGY | Age: 64
End: 2024-07-23
Payer: MEDICARE

## 2024-07-23 VITALS
SYSTOLIC BLOOD PRESSURE: 120 MMHG | WEIGHT: 151 LBS | HEIGHT: 63 IN | DIASTOLIC BLOOD PRESSURE: 84 MMHG | BODY MASS INDEX: 26.75 KG/M2

## 2024-07-23 DIAGNOSIS — Z90.710 HX OF HYSTERECTOMY: ICD-10-CM

## 2024-07-23 DIAGNOSIS — Z01.419 WELL WOMAN EXAM WITH ROUTINE GYNECOLOGICAL EXAM: Primary | ICD-10-CM

## 2024-07-23 DIAGNOSIS — N95.8 GENITOURINARY SYNDROME OF MENOPAUSE: ICD-10-CM

## 2024-07-23 DIAGNOSIS — Z85.43 HISTORY OF OVARIAN CANCER: ICD-10-CM

## 2024-07-23 PROCEDURE — 1160F RVW MEDS BY RX/DR IN RCRD: CPT | Performed by: PHYSICIAN ASSISTANT

## 2024-07-23 PROCEDURE — G0101 CA SCREEN;PELVIC/BREAST EXAM: HCPCS | Performed by: PHYSICIAN ASSISTANT

## 2024-07-23 PROCEDURE — 3079F DIAST BP 80-89 MM HG: CPT | Performed by: PHYSICIAN ASSISTANT

## 2024-07-23 PROCEDURE — 1159F MED LIST DOCD IN RCRD: CPT | Performed by: PHYSICIAN ASSISTANT

## 2024-07-23 PROCEDURE — 3074F SYST BP LT 130 MM HG: CPT | Performed by: PHYSICIAN ASSISTANT

## 2024-07-23 RX ORDER — ESTRADIOL 10 UG/1
1 INSERT VAGINAL 2 TIMES WEEKLY
Qty: 24 TABLET | Refills: 3 | Status: SHIPPED | OUTPATIENT
Start: 2024-07-25 | End: 2024-07-23 | Stop reason: SDUPTHER

## 2024-07-23 RX ORDER — ESTRADIOL 10 UG/1
1 INSERT VAGINAL 2 TIMES WEEKLY
Qty: 24 TABLET | Refills: 3 | Status: SHIPPED | OUTPATIENT
Start: 2024-07-25

## 2024-07-23 NOTE — PROGRESS NOTES
Subjective     Chief Complaint   Patient presents with    Gynecologic Exam     Pt wants to have annual today due to past ovarian and breast cancer, last annual 23, last mammo 24, mammo normal, last dexa 24, dexa normal       History of Present Illness    Mary Rausch is a 64 y.o.  who presents for annual exam.    She is doing well    Has no c/o    Is good about routine exams with pcp and derm  Utd on mammogram   DEXA done in  was wnl    She has family h/o BRIP1  Daughter has tested positive for it as well  She has personal h/o breast and ovarian cancer  Had lumpectomy only  B oophorectomy and hyst done in   She declines to be tested but is aware she is likely positive    Obstetric History:  OB History          2    Para   2    Term   2            AB        Living   2         SAB        IAB        Ectopic        Molar        Multiple        Live Births   2               Menstrual History:     No LMP recorded. Patient has had a hysterectomy.         Current contraception: post menopausal status  History of abnormal Pap smear: no  Received Gardasil immunization: no  Perform regular self breast exam: yes - occl  Family history of uterine or ovarian cancer: yes - personal history  Family History of colon cancer: no  Family history of breast cancer: yes - personal history    Mammogram: up to date.  Colonoscopy: up to date.  DEXA: up to date.    Exercise: moderately active  Calcium/Vitamin D: adequate intake    The following portions of the patient's history were reviewed and updated as appropriate: allergies, current medications, past family history, past medical history, past social history, past surgical history, and problem list.    Review of Systems   All other systems reviewed and are negative.      Review of Systems   Constitutional: Negative for fatigue.   Respiratory: Negative for shortness of breath.    Gastrointestinal: Negative for abdominal pain.   Genitourinary:  "Negative for dysuria.   Neurological: Negative for headaches.   Psychiatric/Behavioral: Negative for dysphoric mood.         Objective   Physical Exam    /84   Ht 160 cm (62.99\")   Wt 68.5 kg (151 lb)   BMI 26.76 kg/m²   General:   alert, comfortable, and no distress   Heart: regular rate and rhythm   Lungs: clear to auscultation bilaterally   Breast: normal appearance, no masses or tenderness, Inspection negative, No nipple retraction or dimpling, No nipple discharge or bleeding, No axillary or supraclavicular adenopathy, Normal to palpation without dominant masses   Neck: no adenopathy and no carotid bruit   Abdomen: Not performed today   CVA: Not performed today   Pelvis: External genitalia: normal general appearance  Urinary system: urethral meatus normal  Vaginal: atrophic mucosa  Cervix: absent  Adnexa: non palpable and removed surgically  Uterus: absent and removed surgically  Bladder: wnl   Extremities: Not performed today   Neurologic: negative   Psychiatric: Normal affect, judgement, and mood     Assessment & Plan   Diagnoses and all orders for this visit:    1. Well woman exam with routine gynecological exam (Primary)    2. History of ovarian cancer  -         3. Hx of hysterectomy    4. Genitourinary syndrome of menopause    Other orders  -     Discontinue: estradiol (VAGIFEM) 10 MCG tablet vaginal tablet; Insert 1 tablet into the vagina 2 (Two) Times a Week.  Dispense: 24 tablet; Refill: 3  -     estradiol (VAGIFEM) 10 MCG tablet vaginal tablet; Insert 1 tablet into the vagina 2 (Two) Times a Week.  Dispense: 24 tablet; Refill: 3        All questions answered.  Breast self exam technique reviewed and patient encouraged to perform self-exam monthly.  Discussed healthy lifestyle modifications.  Recommended 30 minutes of aerobic exercise five times per week.  Discussed calcium needs to prevent osteoporosis.    Pap no longer needed  Mammogram utd  Ca125 ordered per pt request  Refilled " vagifem

## 2024-07-24 LAB — CANCER AG125 SERPL-ACNC: 8.1 U/ML (ref 0–38.1)

## 2024-07-25 ENCOUNTER — TELEPHONE (OUTPATIENT)
Dept: OBSTETRICS AND GYNECOLOGY | Age: 64
End: 2024-07-25
Payer: MEDICARE

## 2025-07-24 ENCOUNTER — OFFICE VISIT (OUTPATIENT)
Dept: OBSTETRICS AND GYNECOLOGY | Age: 65
End: 2025-07-24
Payer: MEDICARE

## 2025-07-24 VITALS
WEIGHT: 143 LBS | SYSTOLIC BLOOD PRESSURE: 122 MMHG | BODY MASS INDEX: 25.34 KG/M2 | HEIGHT: 63 IN | DIASTOLIC BLOOD PRESSURE: 76 MMHG

## 2025-07-24 DIAGNOSIS — N95.2 VAGINAL ATROPHY: ICD-10-CM

## 2025-07-24 DIAGNOSIS — Z85.3 PERSONAL HISTORY OF BREAST CANCER: ICD-10-CM

## 2025-07-24 DIAGNOSIS — Z85.43 HISTORY OF OVARIAN CANCER: ICD-10-CM

## 2025-07-24 DIAGNOSIS — Z01.419 WELL WOMAN EXAM WITH ROUTINE GYNECOLOGICAL EXAM: Primary | ICD-10-CM

## 2025-07-24 DIAGNOSIS — Z90.710 HX OF HYSTERECTOMY: ICD-10-CM

## 2025-07-24 RX ORDER — ESTRADIOL 10 UG/1
1 TABLET, FILM COATED VAGINAL 2 TIMES WEEKLY
Qty: 24 TABLET | Refills: 3 | Status: SHIPPED | OUTPATIENT
Start: 2025-07-24

## 2025-07-24 NOTE — PROGRESS NOTES
Subjective     Chief Complaint   Patient presents with    Annual Exam     Annual exam : pt has personal history of ovarian cancer. ( Normal 2024)  Paps no longer needed  M/g 2025  Dexa 2024  Colonoscopy        History of Present Illness    Mary Rausch is a 65 y.o.  who presents for annual exam.    Margot is doing well  She has no complaints at  Has personal history of breast and ovarian cancer  We do yearly 's    She had her mammogram earlier this year  Also had her DEXA done last year  She sees her primary care provider routinely  Had her colonoscopy in     She has family h/o BRIP1  Daughter has tested positive for it as well  She has personal h/o breast and ovarian cancer  Had lumpectomy only  B oophorectomy and hyst done in   She declines to be tested but is aware she is likely positive    [facility] a [place] at [facility]  Going there after this appointment to see her children and grandchildren    Obstetric History:  OB History          2    Para   2    Term   2            AB        Living   2         SAB        IAB        Ectopic        Molar        Multiple        Live Births   2               Menstrual History:     No LMP recorded. Patient has had a hysterectomy.         Current contraception: post menopausal status  History of abnormal Pap smear: no  Received Gardasil immunization: no  Perform regular self breast exam: yes - occl  Family history of uterine or ovarian cancer: yes - personal h/o ovarian cancer  Family History of colon cancer: no  Family history of breast cancer: yes - personal h/o    Mammogram: up to date.  Colonoscopy: up to date.  DEXA: up to date.    Exercise: moderately active  Calcium/Vitamin D: adequate intake    The following portions of the patient's history were reviewed and updated as appropriate: allergies, current medications, past family history, past medical history, past social history, past surgical history, and  "problem list.    Review of Systems   All other systems reviewed and are negative.      Review of Systems   Constitutional: Negative for fatigue.   Respiratory: Negative for shortness of breath.    Gastrointestinal: Negative for abdominal pain.   Genitourinary: Negative for dysuria.   Neurological: Negative for headaches.   Psychiatric/Behavioral: Negative for dysphoric mood.         Objective   Physical Exam    /76   Ht 160 cm (63\")   Wt 64.9 kg (143 lb)   BMI 25.33 kg/m²   General:   alert, comfortable, and no distress   Heart: regular rate and rhythm   Lungs: clear to auscultation bilaterally   Breast: normal appearance, no masses or tenderness, Inspection negative, No nipple retraction or dimpling, No nipple discharge or bleeding, No axillary or supraclavicular adenopathy, Normal to palpation without dominant masses, positive findings: scarring noted in right breast s/p lumpectomy   Neck: no adenopathy and no carotid bruit   Abdomen: Not performed today   CVA: Not performed today   Pelvis: External genitalia: normal general appearance  Urinary system: urethral meatus normal  Vaginal: atrophic mucosa  Cervix: absent and removed surgically  Adnexa: normal bimanual exam, non palpable, and removed surgically  Uterus: absent and removed surgically  Bladder: wnl  Rectal: deferred  Exam limited by body habitus   Extremities: Not performed today   Neurologic: negative   Psychiatric: Normal affect, judgement, and mood     Assessment & Plan   Diagnoses and all orders for this visit:    1. Well woman exam with routine gynecological exam (Primary)    2. Personal history of breast cancer    3. History of ovarian cancer  -         4. Hx of hysterectomy    5. Vaginal atrophy    Other orders  -     estradiol (VAGIFEM) 10 MCG tablet vaginal tablet; Insert 1 tablet into the vagina 2 (Two) Times a Week.  Dispense: 24 tablet; Refill: 3        All questions answered.  Breast self exam technique reviewed and patient " encouraged to perform self-exam monthly.  Discussed healthy lifestyle modifications.  Recommended 30 minutes of aerobic exercise five times per week.  Discussed calcium needs to prevent osteoporosis.      Pap no longer needed  Ca 125 ordered  Mammogram up-to-date  DEXA up-to-date  Colonoscopy up-to-date  Follow-up in 1 year or call for any problems

## 2025-07-25 LAB — CANCER AG125 SERPL-ACNC: 10.3 U/ML (ref 0–38.1)

## (undated) DEVICE — ADAPT CLN BIOGUARD AIR/H2O DISP

## (undated) DEVICE — TBG SXN CONN/F UNIV 1/4IN 10FT LF STRL

## (undated) DEVICE — KT ORCA ORCAPOD DISP STRL

## (undated) DEVICE — CANN O2 ETCO2 FITS ALL CONN CO2 SMPL A/ 7IN DISP LF

## (undated) DEVICE — LN SMPL CO2 SHTRM SD STREAM W/M LUER

## (undated) DEVICE — SENSR O2 OXIMAX FNGR A/ 18IN NONSTR